# Patient Record
Sex: FEMALE | Race: ASIAN | Employment: STUDENT | URBAN - METROPOLITAN AREA
[De-identification: names, ages, dates, MRNs, and addresses within clinical notes are randomized per-mention and may not be internally consistent; named-entity substitution may affect disease eponyms.]

---
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
## 2025-04-21 ENCOUNTER — TELEPHONE (OUTPATIENT)
Dept: FAMILY MEDICINE CLINIC | Facility: CLINIC | Age: 20
End: 2025-04-21

## 2025-04-21 NOTE — TELEPHONE ENCOUNTER
Patient scheduled her Physical appointment thru My Chart but has stomach bloating issue.  Triage?      Appointment for: Myrna Addison ([id number])   Visit type: MYCHART PHYSICAL ([id number])   7/14/2025 7:00 AM (40 minutes) with Mesha Genao in [facility] [address] [place] [address]      Patient comments:   Stomach bloating issues

## 2025-07-14 ENCOUNTER — OFFICE VISIT (OUTPATIENT)
Dept: FAMILY MEDICINE CLINIC | Facility: CLINIC | Age: 20
End: 2025-07-14
Payer: COMMERCIAL

## 2025-07-14 VITALS
WEIGHT: 132 LBS | HEIGHT: 59 IN | RESPIRATION RATE: 18 BRPM | DIASTOLIC BLOOD PRESSURE: 72 MMHG | BODY MASS INDEX: 26.61 KG/M2 | TEMPERATURE: 98 F | OXYGEN SATURATION: 98 % | HEART RATE: 80 BPM | SYSTOLIC BLOOD PRESSURE: 98 MMHG

## 2025-07-14 DIAGNOSIS — E55.9 VITAMIN D DEFICIENCY: ICD-10-CM

## 2025-07-14 DIAGNOSIS — R42 DIZZINESS: ICD-10-CM

## 2025-07-14 DIAGNOSIS — Z78.9 VEGETARIAN DIET: ICD-10-CM

## 2025-07-14 DIAGNOSIS — Z91.018 FOOD ALLERGY: ICD-10-CM

## 2025-07-14 DIAGNOSIS — Z00.00 ROUTINE GENERAL MEDICAL EXAMINATION AT A HEALTH CARE FACILITY: Primary | ICD-10-CM

## 2025-07-14 NOTE — PROGRESS NOTES
Family Medicine Progress Note    Myrna Addison is a 19 year old female.  ASSESSMENT AND PLAN:  Myrna was seen today for physical and other.    Diagnoses and all orders for this visit:    Routine general medical examination at a health care facility  Discussed health, stress, sleep, and activity. Stress managed, regular Pilates, adequate sleep. Dental and eye exams current.  - Encourage healthy eating.  - Continue regular physical activity.  - Ensure dental and eye exams remain current.  -     TSH [E]; Future  -     CBC W Differential W Platelet [E]; Future    Vegetarian diet  -     Vitamin B12 [E]; Future  -     Ferritin [E]; Future  -     Iron And Tibc [E]; Future    Vitamin D deficiency  -     Vitamin D [E]; Future    Food allergy  Bloating after potatoes and beans suggests food intolerance. Family history supports this. No history of severe allergic reactions. Insurance coverage for allergy panel uncertain. Allergy to shellfish.  - Advise limiting potatoes and beans.  - Order food allergy panel  - Consider allergy specialist referral if symptoms worsen.  -     Adult Food Allergy Prof [E]; Future    Dizziness  Dizziness in hot showers likely due to orthostatic hypotension. Symptoms resolve after leaving shower. Anemia to be ruled out.  - Advise lukewarm water in showers.  - Avoid prolonged standing in shower.  - Suggest calf muscle pumping in shower.  - Order labs to rule out anemia.      Age appropriate anticipatory guidance reviewed  Health Maintenance   Topic Date Due    Meningococcal B Vaccine (1 of 2 - Standard) Never done    Annual Physical  08/02/2025    COVID-19 Vaccine (3 - 2024-25 season) 08/14/2025 (Originally 9/1/2024)    Influenza Vaccine (1) 10/01/2025    DTaP,Tdap,and Td Vaccines (7 - Td or Tdap) 06/20/2027    Annual Depression Screening  Completed    Hepatitis B Vaccines  Completed    Hepatitis A Vaccines  Completed    MMR Vaccines  Completed    Varicella  Vaccines  Completed    Meningococcal Vaccine  Completed    HPV Vaccines  Completed    Pneumococcal Vaccine: Birth to 50yrs  Aged Out     The patient indicates understanding of these issues and agrees to the plan.  Follow-Up: The patient is asked to return in Return in about 1 year (around 7/14/2026) for annual.    Mesha Genao MD        Chief Complaint   Patient presents with    Physical     The following individual(s) verbally consented to be recorded using ambient AI listening technology and understand that they can each withdraw their consent to this listening technology at any point by asking the clinician to turn off or pause the recording:    Patient name: Myrna Addison    HPI:   Myrna Addison is a 19 year old female.  History of Present Illness  Myrna Addison is a 19 year old female who presents for her annual physical.   Never been sexually active.    She experiences bloating primarily after consuming potatoes and beans, both at home and at college. Her uncle has a similar issue with potatoes and avoids them completely. Despite this, she occasionally consumes french fries, which she enjoys.    She describes episodes of dizziness that occur if she showers without eating breakfast first. The dizziness is more pronounced when she is in the shower for a prolonged period, particularly with hot water. Once out of the shower, if the dizziness is severe, she may need to lie down, but this is not always necessary.    She is currently managing stress well in college and continues to see her therapist virtually.     She has started doing Pilates for physical activity and is trying to maintain a healthy diet while in college.      PAST MEDICAL HISTORY:   Past Medical History[1]  PAST SURGICAL HISTORY:   Past Surgical History[2]  ALLERGY:   Allergies[3]  MEDICATIONS:   Current Medications[4]  FAMILY HISTORY:   Family History[5]    SOCIAL HISTORY:   Short Social Hx on File[6]  REVIEW OF SYSTEMS:   Review of  Systems   Constitutional:  Negative for appetite change, fatigue, fever and unexpected weight change.   HENT:  Negative for congestion, ear pain, hearing loss and sore throat.    Eyes:  Negative for discharge, redness and visual disturbance.   Respiratory:  Negative for cough, chest tightness and shortness of breath.    Cardiovascular:  Negative for chest pain and palpitations.   Gastrointestinal:  Negative for abdominal pain, blood in stool, constipation and nausea.   Endocrine: Negative for cold intolerance, heat intolerance and polyuria.   Genitourinary:  Negative for difficulty urinating, dysuria, frequency and urgency.   Musculoskeletal:  Negative for arthralgias, gait problem, joint swelling and myalgias.   Skin:  Negative for rash.   Allergic/Immunologic: Negative for food allergies.   Neurological:  Positive for dizziness. Negative for weakness, numbness and headaches.   Hematological:  Negative for adenopathy. Does not bruise/bleed easily.   Psychiatric/Behavioral:  Negative for dysphoric mood and sleep disturbance. The patient is not nervous/anxious.         PHYSICAL EXAM:   BP 98/72   Pulse 80   Temp 98 °F (36.7 °C) (Skin)   Resp 18   Ht 4' 11\" (1.499 m)   Wt 132 lb (59.9 kg)   LMP 07/14/2025   SpO2 98%   BMI 26.66 kg/m²     Physical Exam  Constitutional:       General: She is not in acute distress.     Appearance: Normal appearance. She is well-developed and normal weight.   HENT:      Head: Normocephalic and atraumatic.      Right Ear: Tympanic membrane, ear canal and external ear normal.      Left Ear: Tympanic membrane, ear canal and external ear normal.      Nose: Nose normal.      Mouth/Throat:      Mouth: Mucous membranes are moist.      Pharynx: Oropharynx is clear.   Eyes:      Extraocular Movements: Extraocular movements intact.      Conjunctiva/sclera: Conjunctivae normal.      Pupils: Pupils are equal, round, and reactive to light.   Neck:      Thyroid: No thyromegaly.    Cardiovascular:      Rate and Rhythm: Normal rate and regular rhythm.      Pulses: Normal pulses.      Heart sounds: Normal heart sounds. No murmur heard.  Pulmonary:      Effort: Pulmonary effort is normal. No respiratory distress.      Breath sounds: Normal breath sounds.   Chest:      Chest wall: No tenderness.   Abdominal:      General: Bowel sounds are normal. There is no distension.      Palpations: Abdomen is soft. There is no hepatomegaly, splenomegaly or mass.      Tenderness: There is no abdominal tenderness.      Hernia: No hernia is present.   Musculoskeletal:         General: Normal range of motion.      Cervical back: Normal range of motion and neck supple.      Right lower leg: No edema.      Left lower leg: No edema.   Lymphadenopathy:      Cervical: No cervical adenopathy.   Skin:     General: Skin is warm.      Findings: No rash.   Neurological:      General: No focal deficit present.      Mental Status: She is alert and oriented to person, place, and time.      Cranial Nerves: No cranial nerve deficit.      Sensory: No sensory deficit.      Motor: No weakness.      Coordination: Coordination normal.      Gait: Gait normal.      Deep Tendon Reflexes: Reflexes are normal and symmetric. Reflexes normal.   Psychiatric:         Mood and Affect: Mood normal.         Behavior: Behavior normal.         Thought Content: Thought content normal.         Judgment: Judgment normal.           The 21st Century Cures Act makes medical notes like these available to patients in the interest of transparency. Please be advised this is a medical document. Medical documents are intended to carry relevant information, facts as evident, and the clinical opinion of the practitioner. The medical note is intended as peer to peer communication and may appear blunt or direct. It is written in medical language and may contain abbreviations or verbiage that are unfamiliar.     Mesha Genao MD             [1]   Past  Medical History:   Allergic rhinitis    Anxiety   [2] History reviewed. No pertinent surgical history.  [3]   Allergies  Allergen Reactions    Amoxil [Amoxicillin Trihydrate]     Seasonal UNKNOWN    Shellfish RASH   [4]   Current Outpatient Medications   Medication Sig Dispense Refill    Multiple Vitamins-Iron (HM ONE DAILY/IRON) Oral Tab Take by mouth. Pt unsure of brand of iron supplement      Mometasone Furoate 0.1 % External Cream Apply to affected areas of eczema twice daily x up to 1 week at a time as needed 50 g 1   [5]   Family History  Problem Relation Age of Onset    Cancer Mother         AT AGE 44    Breast Cancer Mother     High Blood Pressure Father     Diabetes Father     Hypertension Father     Cancer Maternal Grandmother     Breast Cancer Maternal Grandmother     High Blood Pressure Paternal Grandmother     Diabetes Paternal Grandmother     Cancer Paternal Grandmother         AT AGE 64    Hypertension Paternal Grandmother     High Blood Pressure Paternal Grandfather     Diabetes Paternal Grandfather     Hypertension Paternal Grandfather     Stroke Paternal Grandfather         AT AGE 35 YEARS   [6]   Social History  Socioeconomic History    Marital status: Single   Tobacco Use    Smoking status: Never    Smokeless tobacco: Never   Vaping Use    Vaping status: Never Used   Substance and Sexual Activity    Alcohol use: No    Drug use: No   Other Topics Concern    Caffeine Concern No    Exercise No    Seat Belt No    Special Diet No    Stress Concern No    Weight Concern No     Social Drivers of Health     Food Insecurity: No Food Insecurity (7/14/2025)    NCSS - Food Insecurity     Worried About Running Out of Food in the Last Year: No     Ran Out of Food in the Last Year: No   Transportation Needs: No Transportation Needs (7/14/2025)    NCSS - Transportation     Lack of Transportation: No   Housing Stability: Not At Risk (7/14/2025)    NCSS - Housing/Utilities     Has Housing: Yes     Worried About  Losing Housing: No     Unable to Get Utilities: No

## 2025-07-14 NOTE — PATIENT INSTRUCTIONS
VISIT SUMMARY:    Today, we discussed your concerns about possible food intolerance and dizziness. We reviewed your symptoms and family history, and I provided recommendations to help manage these issues. We also talked about your general health, including stress management, physical activity, and routine exams.    YOUR PLAN:    -FOOD INTOLERANCE: Food intolerance means your body has difficulty digesting certain foods, leading to symptoms like bloating. In your case, this seems to be related to potatoes and beans. I recommend you limit these foods in your diet. If your insurance covers it, we will order a food allergy panel to get more information. If your symptoms get worse, we may refer you to an allergy specialist.    -ORTHOSTATIC HYPOTENSION: Orthostatic hypotension is a condition where your blood pressure drops when you stand up, which can cause dizziness. This seems to be happening when you take hot showers, especially if you haven't eaten. I suggest using lukewarm water and avoiding long showers. Doing calf muscle exercises while in the shower can also help. We will order some lab tests to rule out anemia, which can also cause dizziness.    -GENERAL HEALTH MAINTENANCE: We discussed your overall health, including managing stress, staying active with Pilates, and getting enough sleep. Your dental and eye exams are up to date. Keep eating healthy and continue with your regular physical activity. Make sure to keep up with your dental and eye exams.    INSTRUCTIONS:    We will order a food allergy panel if your insurance covers it. Additionally, we will order lab tests to rule out anemia. Please follow the recommendations provided for managing your symptoms and maintaining your general health.

## 2025-07-30 ENCOUNTER — LAB ENCOUNTER (OUTPATIENT)
Dept: LAB | Age: 20
End: 2025-07-30
Attending: FAMILY MEDICINE

## 2025-07-30 DIAGNOSIS — Z91.018 FOOD ALLERGY: ICD-10-CM

## 2025-07-30 DIAGNOSIS — Z78.9 VEGETARIAN DIET: ICD-10-CM

## 2025-07-30 DIAGNOSIS — Z00.00 ROUTINE GENERAL MEDICAL EXAMINATION AT A HEALTH CARE FACILITY: ICD-10-CM

## 2025-07-30 DIAGNOSIS — E55.9 VITAMIN D DEFICIENCY: ICD-10-CM

## 2025-07-30 LAB
BASOPHILS # BLD AUTO: 0.07 X10(3) UL (ref 0–0.2)
BASOPHILS NFR BLD AUTO: 1.2 %
DEPRECATED HBV CORE AB SER IA-ACNC: 10 NG/ML (ref 50–306)
EOSINOPHIL # BLD AUTO: 0.27 X10(3) UL (ref 0–0.7)
EOSINOPHIL NFR BLD AUTO: 4.6 %
ERYTHROCYTE [DISTWIDTH] IN BLOOD BY AUTOMATED COUNT: 13 %
HCT VFR BLD AUTO: 39.1 % (ref 35–48)
HGB BLD-MCNC: 12.7 G/DL (ref 12–16)
IMM GRANULOCYTES # BLD AUTO: 0.01 X10(3) UL (ref 0–1)
IMM GRANULOCYTES NFR BLD: 0.2 %
IRON SATN MFR SERPL: 13 % (ref 15–50)
IRON SERPL-MCNC: 49 UG/DL (ref 50–170)
LYMPHOCYTES # BLD AUTO: 2.05 X10(3) UL (ref 1.5–5)
LYMPHOCYTES NFR BLD AUTO: 34.9 %
MCH RBC QN AUTO: 28.2 PG (ref 26–34)
MCHC RBC AUTO-ENTMCNC: 32.5 G/DL (ref 31–37)
MCV RBC AUTO: 86.9 FL (ref 80–100)
MONOCYTES # BLD AUTO: 0.36 X10(3) UL (ref 0.1–1)
MONOCYTES NFR BLD AUTO: 6.1 %
NEUTROPHILS # BLD AUTO: 3.11 X10 (3) UL (ref 1.5–7.7)
NEUTROPHILS # BLD AUTO: 3.11 X10(3) UL (ref 1.5–7.7)
NEUTROPHILS NFR BLD AUTO: 53 %
PLATELET # BLD AUTO: 389 10(3)UL (ref 150–450)
RBC # BLD AUTO: 4.5 X10(6)UL (ref 3.8–5.3)
TOTAL IRON BINDING CAPACITY: 369 UG/DL (ref 250–425)
TRANSFERRIN SERPL-MCNC: 300 MG/DL (ref 250–380)
TSI SER-ACNC: 1.08 UIU/ML (ref 0.48–4.17)
VIT B12 SERPL-MCNC: 448 PG/ML (ref 211–911)
VIT D+METAB SERPL-MCNC: 24.4 NG/ML (ref 30–100)
VIT D+METAB SERPL-MCNC: 27.5 NG/ML (ref 30–100)
WBC # BLD AUTO: 5.9 X10(3) UL (ref 4–11)

## 2025-07-30 PROCEDURE — 83540 ASSAY OF IRON: CPT | Performed by: FAMILY MEDICINE

## 2025-07-30 PROCEDURE — 86003 ALLG SPEC IGE CRUDE XTRC EA: CPT | Performed by: FAMILY MEDICINE

## 2025-07-30 PROCEDURE — 85025 COMPLETE CBC W/AUTO DIFF WBC: CPT | Performed by: FAMILY MEDICINE

## 2025-07-30 PROCEDURE — 82306 VITAMIN D 25 HYDROXY: CPT | Performed by: FAMILY MEDICINE

## 2025-07-30 PROCEDURE — 83550 IRON BINDING TEST: CPT | Performed by: FAMILY MEDICINE

## 2025-07-30 PROCEDURE — 82785 ASSAY OF IGE: CPT | Performed by: FAMILY MEDICINE

## 2025-07-30 PROCEDURE — 84443 ASSAY THYROID STIM HORMONE: CPT | Performed by: FAMILY MEDICINE

## 2025-07-30 PROCEDURE — 82607 VITAMIN B-12: CPT | Performed by: FAMILY MEDICINE

## 2025-07-30 PROCEDURE — 82728 ASSAY OF FERRITIN: CPT | Performed by: FAMILY MEDICINE

## 2025-07-31 LAB
ALLERGEN BRAZIL NUT: <0.1 KUA/L (ref ?–0.1)
ALMOND IGE QN: 0.29 KUA/L (ref ?–0.1)
CASHEW NUT IGE QN: 0.14 KUA/L (ref ?–0.1)
CLAM IGE QN: <0.1 KUA/L (ref ?–0.1)
CODFISH IGE QN: <0.1 KUA/L (ref ?–0.1)
CORN IGE QN: 0.34 KUA/L (ref ?–0.1)
COW MILK IGE QN: 1.2 KUA/L (ref ?–0.1)
EGG WHITE IGE QN: 1.26 KUA/L (ref ?–0.1)
GLUTEN IGE QN: 0.29 KUA/L (ref ?–0.1)
HAZELNUT IGE QN: 0.34 KUA/L (ref ?–0.1)
IGE SERPL-ACNC: 85 KU/L (ref 2–214)
PEANUT IGE QN: 0.3 KUA/L (ref ?–0.1)
SALMON IGE QN: <0.1 KUA/L (ref ?–0.1)
SCALLOP IGE QN: 0.1 KUA/L (ref ?–0.1)
SESAME SEED IGE QN: 0.87 KUA/L (ref ?–0.1)
SHRIMP IGE QN: <0.1 KUA/L (ref ?–0.1)
SOYBEAN IGE QN: 0.13 KUA/L (ref ?–0.1)
WALNUT IGE QN: 0.44 KUA/L (ref ?–0.1)
WHEAT IGE QN: 0.42 KUA/L (ref ?–0.1)

## (undated) NOTE — LETTER
Name:  Remington Gutierrez Year:  9th Grade Class: Student ID No.:   Address:  [address] [place] [address] [address] [place]  [address] Phone:  [phone] (home)  :  15year old   Name Relationship [address] Work Phone Home Phone Mobile Phone   1.  RICH NEVES 15. Does anyone in your family have hypertrophic cardiomyopathy, Marfan syndrome, arrhythmogenic right ventricular cardiomyopathy, long QT syndrome, short QT syndrome, Brugada syndrome, or catecholaminergic polymorphic ventricular tachycardia? No   15.  [place] 29. Have you ever had a head injury or concussion? No   35. Have you ever had a hit or blow to the head that caused confusion, prolonged headache, or memory problems? No   36. Do you have a history of seizure disorder? No   37.  Do you have headaches with e BP 98/66   Pulse 79   Temp 97 °F (36.1 °C) (Temporal)   Resp 18   Ht 58.66\"   Wt 99 lb 6.4 oz (45.1 kg)   LMP 10/09/2020   SpO2 98%   BMI 20.31 kg/m²  55 %ile (Z= 0.13) based on CDC (Girls, 2-20 Years) BMI-for-age based on BMI available as of 11/3/2020.  f *effective January 2003, the [employer] of Directors approved a recommendation, consistent with the [facility] [employer], that allows [employer] or Advanced Nurse Practitioners to sign off on physicals.    [place] Substance Testing Policy Consent t ©2010 AAFP, AAP, [address] [facility] [place] [employer] for [address] [facility], [facility] for [address] of Sports Medicine.  Permission granted to reprint for noncommercial, educat

## (undated) NOTE — LETTER
[facility] [employer] [facility] [employer] of Child Health Examination       Student's Name  Isabel Mahan Da Signature                                                                                                                                   Title                           Date     Signature Female School   Grade Level/ID#  9th Grade   HEALTH HISTORY          TO BE COMPLETED AND SIGNED BY PARENT/GUARDIAN AND VERIFIED BY HEALTH CARE PROVIDER    ALLERGIES  (Food, drug, insect, other)  Amoxil [Amoxicillin Trihydrate] MEDICATION  (List all prescri BP 98/66   Pulse 79   Temp 97 °F (36.1 °C) (Temporal)   Resp 18   Ht 58.66\"   Wt 99 lb 6.4 oz (45.1 kg)   LMP 10/09/2020   SpO2 98%   BMI 20.31 kg/m²     DIABETES SCREENING  BMI>85% age/sex  No And any two of the following:  Family History No    Ethnic Mi Respiratory Yes                   Diagnosis of Asthma: No Mental Health Yes        Currently Prescribed Asthma Medication:            Quick-relief  medication (e.g. Short Acting Beta Antagonist): No          Controller medication (e.g. inhaled corticostero